# Patient Record
Sex: FEMALE | Race: WHITE | NOT HISPANIC OR LATINO | Employment: FULL TIME | URBAN - METROPOLITAN AREA
[De-identification: names, ages, dates, MRNs, and addresses within clinical notes are randomized per-mention and may not be internally consistent; named-entity substitution may affect disease eponyms.]

---

## 2023-05-01 ENCOUNTER — OFFICE VISIT (OUTPATIENT)
Dept: FAMILY MEDICINE | Facility: CLINIC | Age: 28
End: 2023-05-01
Payer: COMMERCIAL

## 2023-05-01 VITALS
SYSTOLIC BLOOD PRESSURE: 125 MMHG | WEIGHT: 173.5 LBS | HEIGHT: 68 IN | BODY MASS INDEX: 26.3 KG/M2 | TEMPERATURE: 97.8 F | DIASTOLIC BLOOD PRESSURE: 78 MMHG | RESPIRATION RATE: 17 BRPM | HEART RATE: 111 BPM | OXYGEN SATURATION: 98 %

## 2023-05-01 DIAGNOSIS — Z11.59 NEED FOR HEPATITIS C SCREENING TEST: ICD-10-CM

## 2023-05-01 DIAGNOSIS — J45.20 MILD INTERMITTENT ASTHMA WITHOUT COMPLICATION: ICD-10-CM

## 2023-05-01 DIAGNOSIS — Z13.29 SCREENING FOR THYROID DISORDER: ICD-10-CM

## 2023-05-01 DIAGNOSIS — S46.812A TRAPEZIUS STRAIN, LEFT, INITIAL ENCOUNTER: ICD-10-CM

## 2023-05-01 DIAGNOSIS — Z00.00 ANNUAL PHYSICAL EXAM: Primary | ICD-10-CM

## 2023-05-01 DIAGNOSIS — Z13.220 LIPID SCREENING: ICD-10-CM

## 2023-05-01 DIAGNOSIS — Z12.4 CERVICAL CANCER SCREENING: ICD-10-CM

## 2023-05-01 DIAGNOSIS — Z11.4 SCREENING FOR HIV (HUMAN IMMUNODEFICIENCY VIRUS): ICD-10-CM

## 2023-05-01 PROBLEM — N80.9 ENDOMETRIOSIS DETERMINED BY LAPAROSCOPY: Status: ACTIVE | Noted: 2021-02-22

## 2023-05-01 LAB
CHOLEST SERPL-MCNC: 263 MG/DL
HCV AB SERPL QL IA: NONREACTIVE
HDLC SERPL-MCNC: 43 MG/DL
HIV 1+2 AB+HIV1 P24 AG SERPL QL IA: NONREACTIVE
LDLC SERPL CALC-MCNC: 179 MG/DL
NONHDLC SERPL-MCNC: 220 MG/DL
TRIGL SERPL-MCNC: 207 MG/DL
TSH SERPL DL<=0.005 MIU/L-ACNC: 1.21 UIU/ML (ref 0.3–4.2)

## 2023-05-01 PROCEDURE — 90471 IMMUNIZATION ADMIN: CPT | Performed by: FAMILY MEDICINE

## 2023-05-01 PROCEDURE — 99385 PREV VISIT NEW AGE 18-39: CPT | Mod: 25 | Performed by: FAMILY MEDICINE

## 2023-05-01 PROCEDURE — 36415 COLL VENOUS BLD VENIPUNCTURE: CPT | Performed by: FAMILY MEDICINE

## 2023-05-01 PROCEDURE — 84443 ASSAY THYROID STIM HORMONE: CPT | Performed by: FAMILY MEDICINE

## 2023-05-01 PROCEDURE — 90651 9VHPV VACCINE 2/3 DOSE IM: CPT | Performed by: FAMILY MEDICINE

## 2023-05-01 PROCEDURE — 99213 OFFICE O/P EST LOW 20 MIN: CPT | Mod: 25 | Performed by: FAMILY MEDICINE

## 2023-05-01 PROCEDURE — 80061 LIPID PANEL: CPT | Performed by: FAMILY MEDICINE

## 2023-05-01 PROCEDURE — 87389 HIV-1 AG W/HIV-1&-2 AB AG IA: CPT | Performed by: FAMILY MEDICINE

## 2023-05-01 PROCEDURE — 86803 HEPATITIS C AB TEST: CPT | Performed by: FAMILY MEDICINE

## 2023-05-01 RX ORDER — ALBUTEROL SULFATE 90 UG/1
2 AEROSOL, METERED RESPIRATORY (INHALATION) EVERY 6 HOURS PRN
Qty: 18 G | Refills: 11 | Status: SHIPPED | OUTPATIENT
Start: 2023-05-01

## 2023-05-01 ASSESSMENT — ENCOUNTER SYMPTOMS
MYALGIAS: 0
WEAKNESS: 0
DIARRHEA: 0
HEMATOCHEZIA: 0
CHILLS: 0
JOINT SWELLING: 0
HEARTBURN: 0
HEMATURIA: 0
PALPITATIONS: 0
NERVOUS/ANXIOUS: 0
SHORTNESS OF BREATH: 0
COUGH: 0
EYE PAIN: 0
ABDOMINAL PAIN: 0
DIZZINESS: 0
ARTHRALGIAS: 1
FREQUENCY: 0
CONSTIPATION: 0
NAUSEA: 0
SORE THROAT: 0
BREAST MASS: 0
HEADACHES: 0
PARESTHESIAS: 0
DYSURIA: 0
FEVER: 0

## 2023-05-01 NOTE — PROGRESS NOTES
Assessment/Plan    Preventive.  See below orders for screening tests and immunizations.  -  Encouraged patient to bring a copy of her sister's breast cancer genetic testing results to patient's upcoming gynecology appointment.  I suspect that patient will be a candidate for early breast cancer screening  - patient declines COVID booster  - patient prefers to have Pap smear checked at upcoming gynecology visit  -Denies STI concerns    Left shoulder discomfort.  Suspect trapezius strain.  Stretching handout provided.    Asthma.  Well controlled, though still with occasional symptoms during exercise in cold weather.  Restart as needed albuterol.  Okay to take 1 to 2 puffs prior to exercise if desired.    Follow-up annually.    Orders Placed This Encounter   Procedures     REVIEW OF HEALTH MAINTENANCE PROTOCOL ORDERS     HPV9 (GARDASIL 9)     HIV Antigen Antibody Combo     Hepatitis C Screen Reflex to HCV RNA Quant and Genotype     TSH with free T4 reflex     Lipid panel reflex to direct LDL Non-fasting     ----  Chief complaint: Physical (Would like to have thyroid tested, Having joint pain in shoulder-)    Social History     Social History Narrative    Updated 5/2/2023: Grew up in [place]. Lives with roommate. Has cat. No kids. 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     Diagnosed with asthma as a child.  Never required a daily controller inhaler.  Last used inhaler about 10 years ago.  Sometimes experiences chest discomfort when exercising in cold weather.    Left shoulder pain.  Started in summer 2022.  Patient was doing a lot of lifting at that time; she worked at [employer].  Pain occurs most days.  Pain occurs with activity and at rest.  Patient is right-handed.  Patient denies a history of severe injury immediately preceding onset of pain.    Patient would like thyroid testing today.  She denies a history of thyroid problems.  She has been diagnosed with endometriosis and has heard  "that this is associated with thyroid dysfunction.    Exam  /78 (BP Location: Right arm, Patient Position: Sitting, Cuff Size: Adult Regular)   Pulse 111   Temp 97.8  F (36.6  C) (Temporal)   Resp 17   Ht 1.735 m (5' 8.31\")   Wt 78.7 kg (173 lb 8 oz)   LMP  (LMP Unknown)   SpO2 98%   BMI 26.14 kg/m    No LMP recorded (lmp unknown). (Menstrual status: Birth Control).     Oropharynx without abnormal masses.  No cervical adenopathy.  Lung fields clear to auscultation bilaterally.  Heart with regular rate and rhythm, no murmurs.    Left shoulder.  No obvious bony deformity left thoracic paraspinal spasm with tenderness.  No subacromial tenderness.  Mild discomfort with active abduction and extension of the left arm.  Empty can test is negative.  "

## 2023-05-02 PROBLEM — E78.5 DYSLIPIDEMIA: Status: ACTIVE | Noted: 2023-05-02

## 2023-05-02 PROBLEM — J45.20 MILD INTERMITTENT ASTHMA WITHOUT COMPLICATION: Status: ACTIVE | Noted: 2023-05-02

## 2023-05-02 RX ORDER — NORETHINDRONE ACETATE 5 MG
2.5 TABLET ORAL DAILY
COMMUNITY
Start: 2023-03-14

## 2023-05-21 ENCOUNTER — HEALTH MAINTENANCE LETTER (OUTPATIENT)
Age: 28
End: 2023-05-21

## 2024-07-28 ENCOUNTER — HEALTH MAINTENANCE LETTER (OUTPATIENT)
Age: 29
End: 2024-07-28

## 2025-08-10 ENCOUNTER — HEALTH MAINTENANCE LETTER (OUTPATIENT)
Age: 30
End: 2025-08-10